# Patient Record
Sex: MALE | Race: BLACK OR AFRICAN AMERICAN | NOT HISPANIC OR LATINO | Employment: UNEMPLOYED | URBAN - METROPOLITAN AREA
[De-identification: names, ages, dates, MRNs, and addresses within clinical notes are randomized per-mention and may not be internally consistent; named-entity substitution may affect disease eponyms.]

---

## 2021-03-20 ENCOUNTER — IMMUNIZATION (OUTPATIENT)
Dept: INTERNAL MEDICINE | Facility: CLINIC | Age: 21
End: 2021-03-20
Payer: OTHER GOVERNMENT

## 2021-03-20 DIAGNOSIS — Z23 NEED FOR VACCINATION: Primary | ICD-10-CM

## 2021-03-20 PROCEDURE — 91300 COVID-19, MRNA, LNP-S, PF, 30 MCG/0.3 ML DOSE VACCINE: ICD-10-PCS | Mod: ,,, | Performed by: FAMILY MEDICINE

## 2021-03-20 PROCEDURE — 0001A COVID-19, MRNA, LNP-S, PF, 30 MCG/0.3 ML DOSE VACCINE: CPT | Mod: CV19,,, | Performed by: FAMILY MEDICINE

## 2021-03-20 PROCEDURE — 91300 COVID-19, MRNA, LNP-S, PF, 30 MCG/0.3 ML DOSE VACCINE: CPT | Mod: ,,, | Performed by: FAMILY MEDICINE

## 2021-03-20 PROCEDURE — 0001A COVID-19, MRNA, LNP-S, PF, 30 MCG/0.3 ML DOSE VACCINE: ICD-10-PCS | Mod: CV19,,, | Performed by: FAMILY MEDICINE

## 2021-04-10 ENCOUNTER — IMMUNIZATION (OUTPATIENT)
Dept: INTERNAL MEDICINE | Facility: CLINIC | Age: 21
End: 2021-04-10
Payer: OTHER GOVERNMENT

## 2021-04-10 DIAGNOSIS — Z23 NEED FOR VACCINATION: Primary | ICD-10-CM

## 2021-04-10 PROCEDURE — 0002A COVID-19, MRNA, LNP-S, PF, 30 MCG/0.3 ML DOSE VACCINE: ICD-10-PCS | Mod: CV19,,, | Performed by: FAMILY MEDICINE

## 2021-04-10 PROCEDURE — 91300 COVID-19, MRNA, LNP-S, PF, 30 MCG/0.3 ML DOSE VACCINE: CPT | Mod: ,,, | Performed by: FAMILY MEDICINE

## 2021-04-10 PROCEDURE — 91300 COVID-19, MRNA, LNP-S, PF, 30 MCG/0.3 ML DOSE VACCINE: ICD-10-PCS | Mod: ,,, | Performed by: FAMILY MEDICINE

## 2021-04-10 PROCEDURE — 0002A COVID-19, MRNA, LNP-S, PF, 30 MCG/0.3 ML DOSE VACCINE: CPT | Mod: CV19,,, | Performed by: FAMILY MEDICINE

## 2022-04-09 ENCOUNTER — HISTORICAL (OUTPATIENT)
Dept: ADMINISTRATIVE | Facility: HOSPITAL | Age: 22
End: 2022-04-09

## 2022-04-29 VITALS
DIASTOLIC BLOOD PRESSURE: 66 MMHG | SYSTOLIC BLOOD PRESSURE: 115 MMHG | HEIGHT: 69 IN | WEIGHT: 133 LBS | BODY MASS INDEX: 19.7 KG/M2

## 2024-08-10 ENCOUNTER — ANESTHESIA EVENT (OUTPATIENT)
Dept: SURGERY | Facility: HOSPITAL | Age: 24
End: 2024-08-10
Payer: COMMERCIAL

## 2024-08-10 ENCOUNTER — HOSPITAL ENCOUNTER (OUTPATIENT)
Facility: HOSPITAL | Age: 24
Discharge: HOME | End: 2024-08-11
Attending: EMERGENCY MEDICINE | Admitting: STUDENT IN AN ORGANIZED HEALTH CARE EDUCATION/TRAINING PROGRAM
Payer: COMMERCIAL

## 2024-08-10 ENCOUNTER — ANESTHESIA (OUTPATIENT)
Dept: SURGERY | Facility: HOSPITAL | Age: 24
End: 2024-08-10
Payer: COMMERCIAL

## 2024-08-10 DIAGNOSIS — R10.31 RIGHT LOWER QUADRANT ABDOMINAL PAIN: ICD-10-CM

## 2024-08-10 DIAGNOSIS — K35.80 ACUTE APPENDICITIS, UNSPECIFIED ACUTE APPENDICITIS TYPE: Primary | ICD-10-CM

## 2024-08-10 PROBLEM — K37 APPENDICITIS: Status: ACTIVE | Noted: 2024-08-10

## 2024-08-10 LAB
ALBUMIN SERPL-MCNC: 4.5 G/DL (ref 3.5–5)
ALBUMIN/GLOB SERPL: 1.2 RATIO (ref 1.1–2)
ALP SERPL-CCNC: 47 UNIT/L (ref 40–150)
ALT SERPL-CCNC: 13 UNIT/L (ref 0–55)
ANION GAP SERPL CALC-SCNC: 11 MEQ/L
AST SERPL-CCNC: 17 UNIT/L (ref 5–34)
BACTERIA #/AREA URNS AUTO: ABNORMAL /HPF
BASOPHILS # BLD AUTO: 0.05 X10(3)/MCL
BASOPHILS NFR BLD AUTO: 0.4 %
BILIRUB SERPL-MCNC: 0.9 MG/DL
BILIRUB UR QL STRIP.AUTO: NEGATIVE
BUN SERPL-MCNC: 14.5 MG/DL (ref 8.9–20.6)
CALCIUM SERPL-MCNC: 10.4 MG/DL (ref 8.4–10.2)
CHLORIDE SERPL-SCNC: 105 MMOL/L (ref 98–107)
CLARITY UR: CLEAR
CO2 SERPL-SCNC: 21 MMOL/L (ref 22–29)
COLOR UR AUTO: YELLOW
CREAT SERPL-MCNC: 1.19 MG/DL (ref 0.73–1.18)
CREAT/UREA NIT SERPL: 12
EOSINOPHIL # BLD AUTO: 0.06 X10(3)/MCL (ref 0–0.9)
EOSINOPHIL NFR BLD AUTO: 0.5 %
ERYTHROCYTE [DISTWIDTH] IN BLOOD BY AUTOMATED COUNT: 13 % (ref 11.5–17)
GFR SERPLBLD CREATININE-BSD FMLA CKD-EPI: >60 ML/MIN/1.73/M2
GLOBULIN SER-MCNC: 3.8 GM/DL (ref 2.4–3.5)
GLUCOSE SERPL-MCNC: 93 MG/DL (ref 74–100)
GLUCOSE UR QL STRIP: NORMAL
HCT VFR BLD AUTO: 46.3 % (ref 42–52)
HGB BLD-MCNC: 15.3 G/DL (ref 14–18)
HGB UR QL STRIP: NEGATIVE
HYALINE CASTS #/AREA URNS LPF: ABNORMAL /LPF
IMM GRANULOCYTES # BLD AUTO: 0.05 X10(3)/MCL (ref 0–0.04)
IMM GRANULOCYTES NFR BLD AUTO: 0.4 %
KETONES UR QL STRIP: ABNORMAL
LEUKOCYTE ESTERASE UR QL STRIP: NEGATIVE
LIPASE SERPL-CCNC: 6 U/L
LYMPHOCYTES # BLD AUTO: 1.26 X10(3)/MCL (ref 0.6–4.6)
LYMPHOCYTES NFR BLD AUTO: 10.9 %
MCH RBC QN AUTO: 27.7 PG (ref 27–31)
MCHC RBC AUTO-ENTMCNC: 33 G/DL (ref 33–36)
MCV RBC AUTO: 83.9 FL (ref 80–94)
MONOCYTES # BLD AUTO: 1.66 X10(3)/MCL (ref 0.1–1.3)
MONOCYTES NFR BLD AUTO: 14.4 %
MUCOUS THREADS URNS QL MICRO: ABNORMAL /LPF
NEUTROPHILS # BLD AUTO: 8.46 X10(3)/MCL (ref 2.1–9.2)
NEUTROPHILS NFR BLD AUTO: 73.4 %
NITRITE UR QL STRIP: NEGATIVE
NRBC BLD AUTO-RTO: 0 %
PH UR STRIP: 6 [PH]
PLATELET # BLD AUTO: 220 X10(3)/MCL (ref 130–400)
PLATELETS.RETICULATED NFR BLD AUTO: 4.1 % (ref 0.9–11.2)
PMV BLD AUTO: 9.8 FL (ref 7.4–10.4)
POTASSIUM SERPL-SCNC: 3.9 MMOL/L (ref 3.5–5.1)
PROT SERPL-MCNC: 8.3 GM/DL (ref 6.4–8.3)
PROT UR QL STRIP: ABNORMAL
RBC # BLD AUTO: 5.52 X10(6)/MCL (ref 4.7–6.1)
RBC #/AREA URNS AUTO: ABNORMAL /HPF
SODIUM SERPL-SCNC: 137 MMOL/L (ref 136–145)
SP GR UR STRIP.AUTO: 1.04 (ref 1–1.03)
SQUAMOUS #/AREA URNS LPF: ABNORMAL /HPF
UROBILINOGEN UR STRIP-ACNC: 2
WBC # BLD AUTO: 11.54 X10(3)/MCL (ref 4.5–11.5)
WBC #/AREA URNS AUTO: ABNORMAL /HPF

## 2024-08-10 PROCEDURE — 96375 TX/PRO/DX INJ NEW DRUG ADDON: CPT

## 2024-08-10 PROCEDURE — 71000033 HC RECOVERY, INTIAL HOUR: Performed by: STUDENT IN AN ORGANIZED HEALTH CARE EDUCATION/TRAINING PROGRAM

## 2024-08-10 PROCEDURE — G0378 HOSPITAL OBSERVATION PER HR: HCPCS

## 2024-08-10 PROCEDURE — 99222 1ST HOSP IP/OBS MODERATE 55: CPT | Mod: 57,,, | Performed by: STUDENT IN AN ORGANIZED HEALTH CARE EDUCATION/TRAINING PROGRAM

## 2024-08-10 PROCEDURE — 96365 THER/PROPH/DIAG IV INF INIT: CPT

## 2024-08-10 PROCEDURE — 44970 LAPAROSCOPY APPENDECTOMY: CPT | Mod: ,,, | Performed by: STUDENT IN AN ORGANIZED HEALTH CARE EDUCATION/TRAINING PROGRAM

## 2024-08-10 PROCEDURE — 36000708 HC OR TIME LEV III 1ST 15 MIN: Performed by: STUDENT IN AN ORGANIZED HEALTH CARE EDUCATION/TRAINING PROGRAM

## 2024-08-10 PROCEDURE — 96372 THER/PROPH/DIAG INJ SC/IM: CPT

## 2024-08-10 PROCEDURE — 96361 HYDRATE IV INFUSION ADD-ON: CPT

## 2024-08-10 PROCEDURE — 25000003 PHARM REV CODE 250: Performed by: STUDENT IN AN ORGANIZED HEALTH CARE EDUCATION/TRAINING PROGRAM

## 2024-08-10 PROCEDURE — 37000008 HC ANESTHESIA 1ST 15 MINUTES: Performed by: STUDENT IN AN ORGANIZED HEALTH CARE EDUCATION/TRAINING PROGRAM

## 2024-08-10 PROCEDURE — 37000009 HC ANESTHESIA EA ADD 15 MINS: Performed by: STUDENT IN AN ORGANIZED HEALTH CARE EDUCATION/TRAINING PROGRAM

## 2024-08-10 PROCEDURE — 63600175 PHARM REV CODE 636 W HCPCS

## 2024-08-10 PROCEDURE — 25000003 PHARM REV CODE 250

## 2024-08-10 PROCEDURE — 85025 COMPLETE CBC W/AUTO DIFF WBC: CPT

## 2024-08-10 PROCEDURE — 80053 COMPREHEN METABOLIC PANEL: CPT

## 2024-08-10 PROCEDURE — 81001 URINALYSIS AUTO W/SCOPE: CPT

## 2024-08-10 PROCEDURE — 99285 EMERGENCY DEPT VISIT HI MDM: CPT | Mod: 25

## 2024-08-10 PROCEDURE — 27201423 OPTIME MED/SURG SUP & DEVICES STERILE SUPPLY: Performed by: STUDENT IN AN ORGANIZED HEALTH CARE EDUCATION/TRAINING PROGRAM

## 2024-08-10 PROCEDURE — 63600175 PHARM REV CODE 636 W HCPCS: Performed by: STUDENT IN AN ORGANIZED HEALTH CARE EDUCATION/TRAINING PROGRAM

## 2024-08-10 PROCEDURE — 25500020 PHARM REV CODE 255: Performed by: EMERGENCY MEDICINE

## 2024-08-10 PROCEDURE — 36000709 HC OR TIME LEV III EA ADD 15 MIN: Performed by: STUDENT IN AN ORGANIZED HEALTH CARE EDUCATION/TRAINING PROGRAM

## 2024-08-10 PROCEDURE — 83690 ASSAY OF LIPASE: CPT

## 2024-08-10 RX ORDER — DEXAMETHASONE SODIUM PHOSPHATE 4 MG/ML
INJECTION, SOLUTION INTRA-ARTICULAR; INTRALESIONAL; INTRAMUSCULAR; INTRAVENOUS; SOFT TISSUE
Status: DISCONTINUED | OUTPATIENT
Start: 2024-08-10 | End: 2024-08-10

## 2024-08-10 RX ORDER — OXYCODONE HYDROCHLORIDE 5 MG/1
5 TABLET ORAL EVERY 4 HOURS PRN
Status: DISCONTINUED | OUTPATIENT
Start: 2024-08-10 | End: 2024-08-11 | Stop reason: HOSPADM

## 2024-08-10 RX ORDER — CEFAZOLIN SODIUM 1 G/3ML
INJECTION, POWDER, FOR SOLUTION INTRAMUSCULAR; INTRAVENOUS
Status: DISCONTINUED | OUTPATIENT
Start: 2024-08-10 | End: 2024-08-10

## 2024-08-10 RX ORDER — ONDANSETRON HYDROCHLORIDE 2 MG/ML
4 INJECTION, SOLUTION INTRAVENOUS
Status: ACTIVE | OUTPATIENT
Start: 2024-08-10 | End: 2024-08-10

## 2024-08-10 RX ORDER — SODIUM CHLORIDE 0.9 % (FLUSH) 0.9 %
10 SYRINGE (ML) INJECTION
Status: DISCONTINUED | OUTPATIENT
Start: 2024-08-10 | End: 2024-08-11 | Stop reason: HOSPADM

## 2024-08-10 RX ORDER — PROPOFOL 10 MG/ML
VIAL (ML) INTRAVENOUS
Status: DISCONTINUED | OUTPATIENT
Start: 2024-08-10 | End: 2024-08-10

## 2024-08-10 RX ORDER — ACETAMINOPHEN 10 MG/ML
INJECTION, SOLUTION INTRAVENOUS
Status: DISCONTINUED | OUTPATIENT
Start: 2024-08-10 | End: 2024-08-10

## 2024-08-10 RX ORDER — TALC
6 POWDER (GRAM) TOPICAL NIGHTLY PRN
Status: DISCONTINUED | OUTPATIENT
Start: 2024-08-10 | End: 2024-08-11 | Stop reason: HOSPADM

## 2024-08-10 RX ORDER — BUPIVACAINE HYDROCHLORIDE AND EPINEPHRINE 2.5; 5 MG/ML; UG/ML
INJECTION, SOLUTION EPIDURAL; INFILTRATION; INTRACAUDAL; PERINEURAL
Status: DISCONTINUED | OUTPATIENT
Start: 2024-08-10 | End: 2024-08-10 | Stop reason: HOSPADM

## 2024-08-10 RX ORDER — MEPERIDINE HYDROCHLORIDE 25 MG/ML
12.5 INJECTION INTRAMUSCULAR; INTRAVENOUS; SUBCUTANEOUS EVERY 10 MIN PRN
OUTPATIENT
Start: 2024-08-10 | End: 2024-08-11

## 2024-08-10 RX ORDER — ONDANSETRON HYDROCHLORIDE 2 MG/ML
4 INJECTION, SOLUTION INTRAVENOUS DAILY PRN
OUTPATIENT
Start: 2024-08-10

## 2024-08-10 RX ORDER — ENOXAPARIN SODIUM 100 MG/ML
40 INJECTION SUBCUTANEOUS EVERY 24 HOURS
Status: DISCONTINUED | OUTPATIENT
Start: 2024-08-10 | End: 2024-08-11 | Stop reason: HOSPADM

## 2024-08-10 RX ORDER — MIDAZOLAM HYDROCHLORIDE 1 MG/ML
INJECTION INTRAMUSCULAR; INTRAVENOUS
Status: DISCONTINUED | OUTPATIENT
Start: 2024-08-10 | End: 2024-08-10

## 2024-08-10 RX ORDER — DIPHENHYDRAMINE HYDROCHLORIDE 50 MG/ML
25 INJECTION INTRAMUSCULAR; INTRAVENOUS EVERY 6 HOURS PRN
OUTPATIENT
Start: 2024-08-10

## 2024-08-10 RX ORDER — MORPHINE SULFATE 4 MG/ML
2 INJECTION, SOLUTION INTRAMUSCULAR; INTRAVENOUS EVERY 6 HOURS PRN
Status: DISCONTINUED | OUTPATIENT
Start: 2024-08-10 | End: 2024-08-11 | Stop reason: HOSPADM

## 2024-08-10 RX ORDER — PHENYLEPHRINE HCL IN 0.9% NACL 1 MG/10 ML
SYRINGE (ML) INTRAVENOUS
Status: DISCONTINUED | OUTPATIENT
Start: 2024-08-10 | End: 2024-08-10

## 2024-08-10 RX ORDER — ONDANSETRON HYDROCHLORIDE 2 MG/ML
INJECTION, SOLUTION INTRAVENOUS
Status: DISCONTINUED | OUTPATIENT
Start: 2024-08-10 | End: 2024-08-10

## 2024-08-10 RX ORDER — SODIUM CHLORIDE 0.9 % (FLUSH) 0.9 %
10 SYRINGE (ML) INJECTION
OUTPATIENT
Start: 2024-08-10

## 2024-08-10 RX ORDER — SODIUM CHLORIDE 9 MG/ML
1000 INJECTION, SOLUTION INTRAVENOUS
Status: COMPLETED | OUTPATIENT
Start: 2024-08-10 | End: 2024-08-10

## 2024-08-10 RX ORDER — FENTANYL CITRATE 50 UG/ML
INJECTION, SOLUTION INTRAMUSCULAR; INTRAVENOUS
Status: DISCONTINUED | OUTPATIENT
Start: 2024-08-10 | End: 2024-08-10

## 2024-08-10 RX ORDER — LIDOCAINE HYDROCHLORIDE 20 MG/ML
INJECTION INTRAVENOUS
Status: DISCONTINUED | OUTPATIENT
Start: 2024-08-10 | End: 2024-08-10

## 2024-08-10 RX ORDER — ONDANSETRON 4 MG/1
8 TABLET, ORALLY DISINTEGRATING ORAL EVERY 8 HOURS PRN
Status: DISCONTINUED | OUTPATIENT
Start: 2024-08-10 | End: 2024-08-11 | Stop reason: HOSPADM

## 2024-08-10 RX ORDER — KETOROLAC TROMETHAMINE 30 MG/ML
30 INJECTION, SOLUTION INTRAMUSCULAR; INTRAVENOUS
Status: COMPLETED | OUTPATIENT
Start: 2024-08-10 | End: 2024-08-10

## 2024-08-10 RX ORDER — ROCURONIUM BROMIDE 10 MG/ML
INJECTION, SOLUTION INTRAVENOUS
Status: DISCONTINUED | OUTPATIENT
Start: 2024-08-10 | End: 2024-08-10

## 2024-08-10 RX ORDER — HYDROMORPHONE HYDROCHLORIDE 2 MG/ML
0.5 INJECTION, SOLUTION INTRAMUSCULAR; INTRAVENOUS; SUBCUTANEOUS EVERY 5 MIN PRN
OUTPATIENT
Start: 2024-08-10

## 2024-08-10 RX ORDER — ACETAMINOPHEN 325 MG/1
650 TABLET ORAL EVERY 4 HOURS PRN
Status: DISCONTINUED | OUTPATIENT
Start: 2024-08-10 | End: 2024-08-11 | Stop reason: HOSPADM

## 2024-08-10 RX ORDER — LIDOCAINE HYDROCHLORIDE 10 MG/ML
1 INJECTION, SOLUTION INFILTRATION; PERINEURAL ONCE AS NEEDED
Status: DISCONTINUED | OUTPATIENT
Start: 2024-08-10 | End: 2024-08-11 | Stop reason: HOSPADM

## 2024-08-10 RX ORDER — GLUCAGON 1 MG
1 KIT INJECTION
OUTPATIENT
Start: 2024-08-10

## 2024-08-10 RX ORDER — ACETAMINOPHEN 325 MG/1
650 TABLET ORAL EVERY 8 HOURS PRN
Status: DISCONTINUED | OUTPATIENT
Start: 2024-08-10 | End: 2024-08-11 | Stop reason: HOSPADM

## 2024-08-10 RX ORDER — OXYCODONE HYDROCHLORIDE 10 MG/1
10 TABLET ORAL EVERY 4 HOURS PRN
Status: DISCONTINUED | OUTPATIENT
Start: 2024-08-10 | End: 2024-08-11 | Stop reason: HOSPADM

## 2024-08-10 RX ADMIN — ONDANSETRON 4 MG: 2 INJECTION INTRAMUSCULAR; INTRAVENOUS at 05:08

## 2024-08-10 RX ADMIN — ROCURONIUM BROMIDE 50 MG: 10 SOLUTION INTRAVENOUS at 04:08

## 2024-08-10 RX ADMIN — MIDAZOLAM HYDROCHLORIDE 2 MG: 1 INJECTION, SOLUTION INTRAMUSCULAR; INTRAVENOUS at 04:08

## 2024-08-10 RX ADMIN — ACETAMINOPHEN 1000 MG: 10 INJECTION, SOLUTION INTRAVENOUS at 04:08

## 2024-08-10 RX ADMIN — SUGAMMADEX 200 MG: 100 INJECTION, SOLUTION INTRAVENOUS at 05:08

## 2024-08-10 RX ADMIN — IOHEXOL 100 ML: 350 INJECTION, SOLUTION INTRAVENOUS at 12:08

## 2024-08-10 RX ADMIN — FENTANYL CITRATE 50 MCG: 50 INJECTION, SOLUTION INTRAMUSCULAR; INTRAVENOUS at 04:08

## 2024-08-10 RX ADMIN — PIPERACILLIN AND TAZOBACTAM 4.5 G: 4; .5 INJECTION, POWDER, LYOPHILIZED, FOR SOLUTION INTRAVENOUS; PARENTERAL at 01:08

## 2024-08-10 RX ADMIN — DEXAMETHASONE SODIUM PHOSPHATE 4 MG: 4 INJECTION, SOLUTION INTRA-ARTICULAR; INTRALESIONAL; INTRAMUSCULAR; INTRAVENOUS; SOFT TISSUE at 04:08

## 2024-08-10 RX ADMIN — Medication 50 MCG: at 04:08

## 2024-08-10 RX ADMIN — SODIUM CHLORIDE, SODIUM GLUCONATE, SODIUM ACETATE, POTASSIUM CHLORIDE AND MAGNESIUM CHLORIDE: 526; 502; 368; 37; 30 INJECTION, SOLUTION INTRAVENOUS at 04:08

## 2024-08-10 RX ADMIN — CEFAZOLIN 2 G: 330 INJECTION, POWDER, FOR SOLUTION INTRAMUSCULAR; INTRAVENOUS at 04:08

## 2024-08-10 RX ADMIN — Medication 50 MCG: at 05:08

## 2024-08-10 RX ADMIN — ROCURONIUM BROMIDE 20 MG: 10 SOLUTION INTRAVENOUS at 05:08

## 2024-08-10 RX ADMIN — KETOROLAC TROMETHAMINE 30 MG: 30 INJECTION, SOLUTION INTRAMUSCULAR at 10:08

## 2024-08-10 RX ADMIN — ENOXAPARIN SODIUM 40 MG: 40 INJECTION SUBCUTANEOUS at 07:08

## 2024-08-10 RX ADMIN — SODIUM CHLORIDE 1000 ML: 9 INJECTION, SOLUTION INTRAVENOUS at 10:08

## 2024-08-10 RX ADMIN — PROPOFOL 200 MG: 10 INJECTION, EMULSION INTRAVENOUS at 04:08

## 2024-08-10 RX ADMIN — LIDOCAINE HYDROCHLORIDE 80 MG: 20 INJECTION INTRAVENOUS at 04:08

## 2024-08-10 NOTE — H&P
Acute Care Surgery   History and Physical    Patient Name: Antonio Daigle  YOB: 2000  Date: 08/10/2024 6:44 PM  Date of Admission: 8/10/2024  HD#0  POD#Day of Surgery    PRESENTING HISTORY   Chief Complaint/Reason for Admission: <principal problem not specified>    History of Present Illness:  23 yo male with no PMH presenting with LLQ pain that began yesterday. Denies nausea/vomiting. Last meal was yesterday at 2 PM.    Review of Systems:  12 point ROS negative except as stated in HPI    PAST HISTORY:   Past medical history:  none    Past surgical history:  none    Family history:  Family History   Problem Relation Name Age of Onset    Hyperlipidemia Mother      Diabetes Father      Heart attack Paternal Grandfather         Social history:  Social History     Socioeconomic History    Marital status: Unknown   Tobacco Use    Smoking status: Never     Passive exposure: Never    Smokeless tobacco: Never   Substance and Sexual Activity    Alcohol use: Never    Drug use: Never   Social History Narrative    ** Merged History Encounter **          Social History     Tobacco Use   Smoking Status Never    Passive exposure: Never   Smokeless Tobacco Never      Social History     Substance and Sexual Activity   Alcohol Use Never        MEDICATIONS & ALLERGIES:     No current facility-administered medications on file prior to encounter.     No current outpatient medications on file prior to encounter.       Allergies: Review of patient's allergies indicates:  No Known Allergies    Scheduled Meds:   enoxparin  40 mg Subcutaneous Daily    ondansetron  4 mg Intravenous ED 1 Time    piperacillin-tazobactam (Zosyn) IV (PEDS and ADULTS) (extended infusion is not appropriate)  4.5 g Intravenous Q8H       Continuous Infusions:    PRN Meds:  Current Facility-Administered Medications:     acetaminophen, 650 mg, Oral, Q8H PRN    acetaminophen, 650 mg, Oral, Q4H PRN    LIDOcaine HCL 10 mg/ml (1%), 1 mL,  "Intradermal, Once PRN    melatonin, 6 mg, Oral, Nightly PRN    ondansetron, 8 mg, Oral, Q8H PRN    oxyCODONE, 10 mg, Oral, Q4H PRN    oxyCODONE, 5 mg, Oral, Q4H PRN    sodium chloride 0.9%, 10 mL, Intravenous, PRN    OBJECTIVE:   Vital Signs:  VITAL SIGNS: 24 HR MIN & MAX LAST   Temp  Min: 97.9 °F (36.6 °C)  Max: 99.3 °F (37.4 °C)  (P) 97.9 °F (36.6 °C)   BP  Min: 112/53  Max: 146/72  (!) 112/53    Pulse  Min: 65  Max: 86  69    Resp  Min: 18  Max: 26  (!) 26    SpO2  Min: 95 %  Max: 100 %  97 %      HT: 5' 9" (175.3 cm)  WT: 59.9 kg (132 lb)  BMI: 19.5     Intake/output:  Intake/Output - Last 3 Shifts         08/08 0700  08/09 0659 08/09 0700  08/10 0659 08/10 0700  08/11 0659    IV Piggyback   900.9    Total Intake(mL/kg)   900.9 (15)    Urine (mL/kg/hr)   275    Blood   25    Total Output   300    Net   +600.9                   Intake/Output Summary (Last 24 hours) at 8/10/2024 1844  Last data filed at 8/10/2024 1750  Gross per 24 hour   Intake 900.88 ml   Output 300 ml   Net 600.88 ml         Physical Exam:  General: Well developed, well nourished, no acute distress  HEENT: Normocephalic, atraumatic, PERRL  CV: RR  Resp: NWOB  GI:  Abdomen soft, non-distended, TTP LLQ with localized rebound, no guarding  :  Deferred  MSK: No muscle atrophy, cyanosis, peripheral edema, moving all extremities spontaneously  Skin/wounds:  No rashes, ulcers, erythema  Neuro:  CNII-XII grossly intact, alert and oriented to person, place, and time    Labs:  Troponin:  No results for input(s): "TROPONINI" in the last 72 hours.  CBC:  Recent Labs     08/10/24  1011   WBC 11.54*   RBC 5.52   HGB 15.3   HCT 46.3      MCV 83.9   MCH 27.7   MCHC 33.0     CMP:  Recent Labs     08/10/24  1011   CALCIUM 10.4*   ALBUMIN 4.5      K 3.9   CO2 21*      BUN 14.5   CREATININE 1.19*   ALKPHOS 47   ALT 13   AST 17   BILITOT 0.9         Diagnostic Results:  CT Abdomen Pelvis With IV Contrast NO Oral Contrast   Final Result    "   Findings consistent with acute appendicitis as detailed above without evidence of perforation or abscess.         Electronically signed by: Jonathan Noble MD   Date:    08/10/2024   Time:    12:51          ASSESSMENT & PLAN:    23 yo male with no PMH presenting with acute appendicitis    - Plan for OR today  - Zosyn  - NPO  - MMPC  - Bowel regimen  - PRN anti-emetics  - SCDs and Lovenox    Jolene Dent MD  [employer] General Surgery, PGY-1  08/10/2024

## 2024-08-10 NOTE — TRANSFER OF CARE
"Anesthesia Transfer of Care Note    Patient: Antonio Daigle    Procedure(s) Performed: Procedure(s) (LRB):  APPENDECTOMY, LAPAROSCOPIC (N/A)    Patient location: PACU    Anesthesia Type: general    Transport from OR: Transported from OR on room air with adequate spontaneous ventilation    Post pain: adequate analgesia    Post assessment: no apparent anesthetic complications    Post vital signs: stable    Level of consciousness: sedated    Nausea/Vomiting: no nausea/vomiting    Complications: none    Transfer of care protocol was followed      Last vitals: Visit Vitals  BP (!) 143/72 (BP Location: Right arm, Patient Position: Lying)   Pulse 65   Temp 37.4 °C (99.3 °F) (Oral)   Resp 18   Ht 5' 9" (1.753 m)   Wt 59.9 kg (132 lb)   SpO2 98%   BMI 19.49 kg/m²     "

## 2024-08-10 NOTE — ANESTHESIA PROCEDURE NOTES
Intubation    Date/Time: 8/10/2024 4:38 PM    Performed by: Charla Fish CRNA  Authorized by: Troy Domínguez MD    Intubation:     Induction:  Intravenous    Intubated:  Postinduction    Mask Ventilation:  Easy mask    Attempts:  1    Attempted By:  CRNA    Method of Intubation:  Direct    Blade:  Ayala 2    Laryngeal View Grade: Grade I - full view of cords      Difficult Airway Encountered?: No      Complications:  None    Airway Device:  Oral endotracheal tube    Airway Device Size:  7.5    Style/Cuff Inflation:  Cuffed (inflated to minimal occlusive pressure) (cuff inflated to [id number] as verified by manometer)    Inflation Amount (mL):  6    Tube secured:  22    Secured at:  The lips    Placement Verified By:  Capnometry    Complicating Factors:  None    Findings Post-Intubation:  BS equal bilateral and atraumatic/condition of teeth unchanged

## 2024-08-10 NOTE — ED PROVIDER NOTES
Encounter Date: 8/10/2024       History     Chief Complaint   Patient presents with    Abdominal Pain     RLQ pain initially yesterday. Pain reproducible when walking. +nausea, denies vomiting. Advil 0730 without relief.      24 y.o.  male presents to Emergency Department with a chief complaint of abdominal pain. Symptoms began on yesterday and have been constant since onset. Associated symptoms include nausea. Symptoms are aggravated with palpation and movement and there are no alleviating factors. The patient denies CP, SOB, fever, vomiting, injury/trauma, or chills. States he recently lifted boards, but did not think they were heavy. No other reported symptoms at this time      The history is provided by the patient. No  was used.   Abdominal Pain  The current episode started yesterday. The onset of the illness was abrupt. The abdominal pain is located in the RLQ. The abdominal pain does not radiate. The other symptoms of the illness include nausea. The other symptoms of the illness do not include fatigue, jaundice, shortness of breath, vomiting, diarrhea or dysuria.   Nausea began yesterday.   The patient has not had a change in bowel habit. Symptoms associated with the illness do not include chills, anorexia, diaphoresis, urgency, hematuria or frequency.     Review of patient's allergies indicates:  No Known Allergies  History reviewed. No pertinent past medical history.  History reviewed. No pertinent surgical history.  No family history on file.     Review of Systems   Constitutional:  Negative for chills, diaphoresis and fatigue.   Eyes:  Negative for photophobia and visual disturbance.   Respiratory:  Negative for cough, shortness of breath, wheezing and stridor.    Cardiovascular:  Negative for chest pain, palpitations and leg swelling.   Gastrointestinal:  Positive for abdominal pain and nausea. Negative for anorexia, diarrhea, jaundice and vomiting.   Genitourinary:   Negative for dysuria, flank pain, frequency, hematuria and urgency.   All other systems reviewed and are negative.      Physical Exam     Initial Vitals   BP Pulse Resp Temp SpO2   08/10/24 0947 08/10/24 0947 08/10/24 0947 08/10/24 0947 08/10/24 1018   123/72 86 18 98.7 °F (37.1 °C) 98 %      MAP       --                Physical Exam    Nursing note and vitals reviewed.  Constitutional: He appears well-developed and well-nourished. He is not diaphoretic. He is cooperative.  Non-toxic appearance. No distress.   HENT:   Head: Normocephalic and atraumatic.   Right Ear: External ear normal.   Left Ear: External ear normal.   Nose: Nose normal.   Eyes: Conjunctivae and EOM are normal. Pupils are equal, round, and reactive to light.   Neck: Neck supple.   Normal range of motion.  Cardiovascular:  Normal rate, regular rhythm, S1 normal, S2 normal, normal heart sounds, intact distal pulses and normal pulses.           Pulmonary/Chest: Effort normal and breath sounds normal. No tachypnea and no bradypnea. No respiratory distress. He has no decreased breath sounds. He has no wheezes. He has no rhonchi. He has no rales. He exhibits no tenderness.   Abdominal: Abdomen is soft. Bowel sounds are normal. He exhibits no distension. There is abdominal tenderness in the right lower quadrant. There is no rebound.   Musculoskeletal:         General: Normal range of motion.      Cervical back: Normal range of motion and neck supple.     Neurological: He is alert and oriented to person, place, and time. He has normal strength. No sensory deficit. GCS score is 15. GCS eye subscore is 4. GCS verbal subscore is 5. GCS motor subscore is 6.   Skin: Skin is warm and dry. Capillary refill takes less than 2 seconds.   Psychiatric: He has a normal mood and affect. Thought content normal.         ED Course   Procedures  Labs Reviewed   COMPREHENSIVE METABOLIC PANEL - Abnormal       Result Value    Sodium 137      Potassium 3.9      Chloride 105       CO2 21 (*)     Glucose 93      Blood Urea Nitrogen 14.5      Creatinine 1.19 (*)     Calcium 10.4 (*)     Protein Total 8.3      Albumin 4.5      Globulin 3.8 (*)     Albumin/Globulin Ratio 1.2      Bilirubin Total 0.9      ALP 47      ALT 13      AST 17      eGFR >60      Anion Gap 11.0      BUN/Creatinine Ratio 12     URINALYSIS, REFLEX TO URINE CULTURE - Abnormal    Color, UA Yellow      Appearance, UA Clear      Specific Gravity, UA 1.036 (*)     pH, UA 6.0      Protein, UA 1+ (*)     Glucose, UA Normal      Ketones, UA 1+ (*)     Blood, UA Negative      Bilirubin, UA Negative      Urobilinogen, UA 2.0 (*)     Nitrites, UA Negative      Leukocyte Esterase, UA Negative      RBC, UA 0-5      WBC, UA 0-5      Bacteria, UA None Seen      Squamous Epithelial Cells, UA None Seen      Mucous, UA Few (*)     Hyaline Casts, UA 0-2 (*)    CBC WITH DIFFERENTIAL - Abnormal    WBC 11.54 (*)     RBC 5.52      Hgb 15.3      Hct 46.3      MCV 83.9      MCH 27.7      MCHC 33.0      RDW 13.0      Platelet 220      MPV 9.8      Neut % 73.4      Lymph % 10.9      Mono % 14.4      Eos % 0.5      Basophil % 0.4      Lymph # 1.26      Neut # 8.46      Mono # 1.66 (*)     Eos # 0.06      Baso # 0.05      IG# 0.05 (*)     IG% 0.4      NRBC% 0.0      IPF 4.1     LIPASE - Normal    Lipase Level 6     CBC W/ AUTO DIFFERENTIAL    Narrative:     The following orders were created for panel order CBC W/ AUTO DIFFERENTIAL.  Procedure                               Abnormality         Status                     ---------                               -----------         ------                     CBC with Differential[[id number]]       Abnormal            Final result                 Please view results for these tests on the individual orders.          Imaging Results              CT Abdomen Pelvis With IV Contrast NO Oral Contrast (Final result)  Result time 08/10/24 12:51:25      Final result by Jonathan Noble MD (08/10/24 12:51:25)                    Impression:      Findings consistent with acute appendicitis as detailed above without evidence of perforation or abscess.      Electronically signed by: Jonathan Noble MD  Date:    08/10/2024  Time:    12:51               Narrative:    EXAMINATION:  CT ABDOMEN PELVIS WITH IV CONTRAST    CLINICAL HISTORY:  RLQ abdominal pain (Age >= 14y);    TECHNIQUE:  Axial images of the abdomen and pelvis were obtained with IV contrast administration.  Coronal and sagittal reconstructions were provided.  Three dimensional and MIP images were obtained and evaluated.  Total DLP was 345 mGy-cm. Dose lowering technique and automated exposure control were utilized for this exam.    COMPARISON:  None    FINDINGS:  The bilateral lung bases are clear.  The heart is normal in size.    The liver is homogeneous in attenuation.  The portal vein is patent.  The gallbladder, spleen, pancreas, and adrenal glands are normal.  The bilateral kidneys are normal.  There is no hydronephrosis or nephrolithiasis.    The appendix is dilated and fluid-filled measuring 16 mm in diameter.  There are 2 appendicoliths at the mid appendix.  There is surrounding fluid in the right pericolic gutter.  There is no extraluminal air.  There is no abscess.  The colon is normal.    The urinary bladder is normal.  There is no pelvic or retroperitoneal lymphadenopathy.  The aorta is nonaneurysmal.  There is no lytic or blastic osseous lesion.                                       Medications   ondansetron injection 4 mg (4 mg Intravenous Not Given 8/10/24 1000)   0.9%  NaCl infusion (0 mLs Intravenous Stopped 8/10/24 1107)   ketorolac injection 30 mg (30 mg Intravenous Given 8/10/24 1047)   iohexoL (OMNIPAQUE 350) injection 100 mL (100 mLs Intravenous Given 8/10/24 1241)   piperacillin-tazobactam (ZOSYN) 4.5 g in D5W 100 mL IVPB (MB+) (4.5 g Intravenous New Bag 8/10/24 1335)     Medical Decision Making  Patient awake, alert, has non-labored breathing, and  follows commands appropriately. Arrived to ED due to RLQ abdominal pain and nausea. Symptoms began on yesterday. Afebrile. NAD Noted.         Differential Diagnosis: Appendicitis, Kidney Stone, Musculoskeletal Pain    Amount and/or Complexity of Data Reviewed  Labs: ordered. Decision-making details documented in ED Course.     Details: Mild leukocytosis noted. CO2-21. Informed patient of results.   Radiology: ordered. Decision-making details documented in ED Course.     Details: Informed patient of results.   Discussion of management or test interpretation with external provider(s): Patient's CT revealed appendicitis; will admit to surgical services for further treatment and evaluation. Discussed plan of care and interventions with patient. Agreed to and aware of plan of care. Comfortable being admitted.    Risk  Decision regarding hospitalization.               ED Course as of 08/10/24 1418   Sat Aug 10, 2024   1039 WBC(!): 11.54 [JA]   1039 CO2(!): 21 [JA]   1216 Patient reports improvement in symptoms after med admin. Pending CT scan.  [JA]   1255 CT Abdomen Pelvis With IV Contrast NO Oral Contrast  Findings consistent with acute appendicitis as detailed above without evidence of perforation or abscess. [JA]   1255 Discussed patient with Dr. Zuniga who assessed the patient at bedside. Will consult surgery if CT reveals appendicitis.  [JA]   1300 Surgery paged.  [JA]   1411 Dr. Dent, with surgery at bedside. Will admit to services. No further instruction or recommendations given.  [JA]      ED Course User Index  [JA] Ibis Rothman, MAU                             Clinical Impression:  Final diagnoses:  [R10.31] Right lower quadrant abdominal pain (Primary)  [K35.80] Acute appendicitis, unspecified acute appendicitis type          ED Disposition Condition    Admit Stable                Ibis Rothman, MAU  08/10/24 1416

## 2024-08-10 NOTE — BRIEF OP NOTE
Brief Operative Note  Patient Name: Antonio Daigle  MRN: [id number]  YOB: 2000  Admit Date: 8/10/2024  #0  Date of Procedure: 08/10/2024     Procedure: Laparoscopic appendectomy    Surgeon(s) and Role:  Staff: Dr. Lorenzo Ratliff  Resident(s): Jolene Dent PGY1, Avelina Valenzuela PGY2    Pre-Operative Diagnosis: acute appendicitis    Post-Operative Diagnosis: Same    Anesthesia: GETA    Operative Findings: inflamed appendix without perforation or abscess    Description of Technical Procedures: Refer to full dictated operative report    Estimated Blood Loss (EBL): 5 cc           Implants: none    Drains: none    Specimens: appendix    Complications: none            Condition: stable    Disposition: floor    Jolene Dent MD  [employer] General Surgery, PGY-1  08/10/2024

## 2024-08-10 NOTE — ANESTHESIA PREPROCEDURE EVALUATION
08/10/2024  Antonio Daigle is a 24 y.o., male.      Na 137, K 3.9, Cr 1.19  15 / 46 / 220k    Pre-op Assessment    I have reviewed the Patient Summary Reports.     I have reviewed the Nursing Notes. I have reviewed the NPO Status.   I have reviewed the Medications.     Review of Systems  Anesthesia Hx:               Denies Personal Hx of Anesthesia complications.                    Cardiovascular:  Exercise tolerance: good                                           Pulmonary:  Pulmonary Normal                       Hepatic/GI:        Acute appendicitis          Neurological:  Neurology Normal                                      Endocrine:  Endocrine Normal            Psych:  Psychiatric Normal                    Physical Exam  General: Well nourished, Cooperative and Alert    Airway:  Mallampati: II   Mouth Opening: Normal  TM Distance: Normal  Tongue: Normal    Dental:  Intact    Chest/Lungs:  Normal Respiratory Rate    Heart:  Rate: Normal        Anesthesia Plan  Type of Anesthesia, risks & benefits discussed:    Anesthesia Type: Gen ETT  Intra-op Monitoring Plan: Standard ASA Monitors  Post Op Pain Control Plan: multimodal analgesia  Induction:  IV  Informed Consent: Informed consent signed with the Patient and all parties understand the risks and agree with anesthesia plan.  All questions answered.   ASA Score: 1 Emergent  Day of Surgery Review of History & Physical: H&P Update referred to the surgeon/provider.    Ready For Surgery From Anesthesia Perspective.     .

## 2024-08-11 VITALS
OXYGEN SATURATION: 98 % | WEIGHT: 132 LBS | HEART RATE: 78 BPM | TEMPERATURE: 99 F | DIASTOLIC BLOOD PRESSURE: 58 MMHG | HEIGHT: 69 IN | SYSTOLIC BLOOD PRESSURE: 105 MMHG | BODY MASS INDEX: 19.55 KG/M2 | RESPIRATION RATE: 18 BRPM

## 2024-08-11 PROCEDURE — 25000003 PHARM REV CODE 250

## 2024-08-11 PROCEDURE — G0378 HOSPITAL OBSERVATION PER HR: HCPCS

## 2024-08-11 RX ORDER — OXYCODONE HYDROCHLORIDE 5 MG/1
5 TABLET ORAL EVERY 4 HOURS PRN
Qty: 10 TABLET | Refills: 0 | Status: SHIPPED | OUTPATIENT
Start: 2024-08-11 | End: 2024-08-11 | Stop reason: HOSPADM

## 2024-08-11 RX ORDER — OXYCODONE AND ACETAMINOPHEN 5; 325 MG/1; MG/1
1 TABLET ORAL EVERY 6 HOURS PRN
Qty: 10 TABLET | Refills: 0 | Status: SHIPPED | OUTPATIENT
Start: 2024-08-11

## 2024-08-11 RX ADMIN — OXYCODONE 5 MG: 5 TABLET ORAL at 03:08

## 2024-08-11 RX ADMIN — OXYCODONE 5 MG: 5 TABLET ORAL at 07:08

## 2024-08-11 NOTE — PLAN OF CARE
08/11/24 1203   Final Note   Assessment Type Final Discharge Note   Anticipated Discharge Disposition Home   Post-Acute Status   Discharge Delays None known at this time     Patient discharged to home with no identified needs.

## 2024-08-11 NOTE — NURSING
Discharged home with prescription to be picked up at [facility]. Verbalized understanding instructions given.  W/C to main entrance for .

## 2024-08-11 NOTE — DISCHARGE SUMMARY
[facility] [facility]  General Surgery  Discharge Summary      Patient Name: Antonio Daigle  MRN: [id number]  Admission Date: 8/10/2024  Hospital Length of Stay: 0 days  Discharge Date and Time:  08/11/2024 11:06 AM  Attending Physician: Lorenzo Ratliff MD   Discharging Provider: Avelina Valenzuela MD  Primary Care Provider: Olga Kelly Jr., MD     HPI: 23 yo male with no PMH presenting with LLQ pain that began yesterday. Denies nausea/vomiting. Last meal was yesterday at 2 PM.     Procedure(s) (LRB):  APPENDECTOMY, LAPAROSCOPIC (N/A)     Hospital Course: Antonio Daigle is a 24 y.o. male admitted on 8/10/2024. Patient was seen and examined in ED. Clinical presentation suggested acute appendicitis. After risks, benefits and alternatives were discussed patient was scheduled for a lap appy on 8/10 at [facility]. Patient underwent the aforementioned procedure without complication. For further details please refer to the operative report. Over the ensuing hospital course patients clinical condition continued to improve and on 8/11/2024 all discharge criteria had been met and patient was deemed stable enough for discharge.    Consults: none    Significant Diagnostic Studies: Radiology: CT scan: CT ABDOMEN PELVIS WITH CONTRAST:   Results for orders placed or performed during the hospital encounter of 08/10/24   CT Abdomen Pelvis With IV Contrast NO Oral Contrast    Narrative    EXAMINATION:  CT ABDOMEN PELVIS WITH IV CONTRAST    CLINICAL HISTORY:  RLQ abdominal pain (Age >= 14y);    TECHNIQUE:  Axial images of the abdomen and pelvis were obtained with IV contrast administration.  Coronal and sagittal reconstructions were provided.  Three dimensional and MIP images were obtained and evaluated.  Total DLP was 345 mGy-cm. Dose lowering technique and automated exposure control were utilized for this exam.    COMPARISON:  None    FINDINGS:  The bilateral lung bases are clear.  The  heart is normal in size.    The liver is homogeneous in attenuation.  The portal vein is patent.  The gallbladder, spleen, pancreas, and adrenal glands are normal.  The bilateral kidneys are normal.  There is no hydronephrosis or nephrolithiasis.    The appendix is dilated and fluid-filled measuring 16 mm in diameter.  There are 2 appendicoliths at the mid appendix.  There is surrounding fluid in the right pericolic gutter.  There is no extraluminal air.  There is no abscess.  The colon is normal.    The urinary bladder is normal.  There is no pelvic or retroperitoneal lymphadenopathy.  The aorta is nonaneurysmal.  There is no lytic or blastic osseous lesion.      Impression    Findings consistent with acute appendicitis as detailed above without evidence of perforation or abscess.      Electronically signed by: Jonathan Noble MD  Date:    08/10/2024  Time:    12:51       Pending Diagnostic Studies:       Procedure Component Value Units Date/Time    Specimen to Pathology [[id number]] Collected: 08/10/24 1710    Order Status: Sent Lab Status: No result     Specimen: Tissue from Appendix           Final Active Diagnoses:    Diagnosis Date Noted POA    PRINCIPAL PROBLEM:  Appendicitis [K37] 08/10/2024 Yes      Problems Resolved During this Admission:      Discharged Condition: stable    Disposition: Home or Self Care    Follow Up:    Patient Instructions:      Diet Adult Regular     Activity as tolerated     Medications:  Reconciled Home Medications:      Medication List        START taking these medications      oxyCODONE 5 MG immediate release tablet  Commonly known as: ROXICODONE  Take 1 tablet (5 mg total) by mouth every 4 (four) hours as needed for Pain.              Avelina Valenzuela MD  General Surgery  [facility] [address] [facility]

## 2024-08-11 NOTE — NURSING
Nurses Note -- 4 Eyes      8/10/2024   8:00 PM      Skin assessed during: Admit      [] No Altered Skin Integrity Present    []Prevention Measures Documented      [x] Yes- Altered Skin Integrity Present or Discovered   [x] LDA Added if Not in Epic (Describe Wound)   [x] New Altered Skin Integrity was Present on Admit and Documented in LDA   [] Wound Image Taken    Wound Care Consulted? No, surgical incisions.  Attending Nurse:  Daiana Jerome RN/Staff Member:   Francisca

## 2024-08-11 NOTE — PLAN OF CARE
Problem: Adult Inpatient Plan of Care  Goal: Plan of Care Review  8/11/2024 1401 by Bridgette Buck RN  Outcome: Met  8/11/2024 1257 by Bridgette Buck RN  Outcome: Progressing  Goal: Patient-Specific Goal (Individualized)  8/11/2024 1401 by Bridgette Buck RN  Outcome: Met  8/11/2024 1257 by Bridgette Buck RN  Outcome: Progressing  Goal: Absence of Hospital-Acquired Illness or Injury  8/11/2024 1401 by Bridgette Buck RN  Outcome: Met  8/11/2024 1257 by Bridgette Buck RN  Outcome: Progressing  Goal: Optimal Comfort and Wellbeing  8/11/2024 1401 by Bridgette Buck RN  Outcome: Met  8/11/2024 1257 by Bridgette Buck RN  Outcome: Progressing  Goal: Readiness for Transition of Care  8/11/2024 1401 by Bridgette Buck RN  Outcome: Met  8/11/2024 1257 by Bridgette Buck RN  Outcome: Progressing     Problem: Wound  Goal: Optimal Coping  8/11/2024 1401 by Bridgette Buck RN  Outcome: Met  8/11/2024 1257 by Bridgette Buck RN  Outcome: Progressing  Goal: Optimal Functional Ability  8/11/2024 1401 by Bridgette Buck RN  Outcome: Met  8/11/2024 1257 by Bridgette Buck RN  Outcome: Progressing  Goal: Absence of Infection Signs and Symptoms  8/11/2024 1401 by Bridgette Buck RN  Outcome: Met  8/11/2024 1257 by Bridgette Buck RN  Outcome: Progressing  Goal: Improved Oral Intake  8/11/2024 1401 by Bridgette Buck RN  Outcome: Met  8/11/2024 1257 by Bridgette Buck RN  Outcome: Progressing  Goal: Optimal Pain Control and Function  8/11/2024 1401 by Bridgette Buck RN  Outcome: Met  8/11/2024 1257 by Bridgette Buck RN  Outcome: Progressing  Goal: Skin Health and Integrity  8/11/2024 1401 by Bridgette Buck RN  Outcome: Met  8/11/2024 1257 by Bridgette Buck RN  Outcome: Progressing  Goal: Optimal Wound Healing  8/11/2024 1401 by Bridgette Buck RN  Outcome: Met  8/11/2024 1257 by Buck, Bridgette, RN  Outcome: Progressing     Problem: Fall Injury Risk  Goal: Absence of Fall and Fall-Related  Injury  8/11/2024 1401 by Bridgette Buck, RN  Outcome: Met  8/11/2024 1257 by Bridgette Buck, RN  Outcome: Progressing

## 2024-08-11 NOTE — ANESTHESIA POSTPROCEDURE EVALUATION
Anesthesia Post Evaluation    Patient: Antonio Daigle    Procedure(s) Performed: Procedure(s) (LRB):  APPENDECTOMY, LAPAROSCOPIC (N/A)    Final Anesthesia Type: general      Patient location during evaluation: PACU  Patient participation: Yes- Able to Participate  Level of consciousness: awake and alert  Post-procedure vital signs: reviewed and stable  Pain management: adequate  Airway patency: patent    PONV status at discharge: No PONV  Anesthetic complications: no      Respiratory status: unassisted  Hydration status: euvolemic  Follow-up not needed.              Vitals Value Taken Time   /56 08/11/24 0439   Temp 37.4 °C (99.4 °F) 08/11/24 0439   Pulse 74 08/11/24 0439   Resp 18 08/11/24 0439   SpO2 97 % 08/11/24 0439         Event Time   Out of Recovery 19:43:13         Pain/Angie Score: Pain Rating Prior to Med Admin: 4 (8/10/2024 10:47 AM)  Pain Rating Post Med Admin: 3 (8/10/2024  1:10 PM)  Angie Score: 9 (8/10/2024  7:20 PM)

## 2024-08-12 ENCOUNTER — TELEPHONE (OUTPATIENT)
Dept: SURGERY | Facility: CLINIC | Age: 24
End: 2024-08-12
Payer: COMMERCIAL

## 2024-08-12 NOTE — TELEPHONE ENCOUNTER
----- Message from Abigail Johnson RN sent at 8/12/2024 12:26 PM CDT -----  2 week lap appy  ----- Message -----  From: Avelina Valenzuela MD  Sent: 8/11/2024  11:13 AM CDT  To: Abigail Johnson RN    Please have patient follow up in clinic in 2 weeks, thanks!

## 2024-08-13 LAB — PSYCHE PATHOLOGY RESULT: NORMAL

## 2024-08-16 NOTE — OP NOTE
APPENDECTOMY, LAPAROSCOPIC  Procedure Note    Antonio Daigle  8/10/2024    Pre-op Diagnosis: Acute appendicitis, unspecified acute appendicitis type [K35.80]       Post-op Diagnosis: same    Procedure(s):  APPENDECTOMY, LAPAROSCOPIC    Surgeon(s):  Lorenzo Ratliff MD    Anesthesia: General    Staff:   Circulator: Kashif Aquino, LINSEY; Joaquín Sen RN  Scrub Person: Duglas Wilson    Estimated Blood Loss: minimal               Specimens: appendix      Drains: none    Operative Technique:  Risks and benefits were discussed with patient and family at bedside, informed consent signed.  Patient was taken to the OR and placed supine, endotracheal intubation was successful.  The abdomen was then prepped and draped in sterile fashion.  A time out was completed to confirm correct patient, procedure, and all staff was in agreement.      A stab incision was made in the left upper quadrant and a Veress needle was introduced into the abdomen.  Insufflation was successful and without issue.  A otoniel-umbilical incision was then made with the scapel and a 5mm optical port and the camera was introduced into the abdomen without issue.  An additional 5mm trocar port was placed supra-pubic and a 12mm trocar port was placed in the left lower quadrant under direct visualization.  Attention was then directed to the right lower quadrant of the abdomen.  The appendix appeared to have acute inflammatory changes.  The appendix was carefully freed from surrounding adhesions using blunt dissection.  The appendix was then retracted superiorly and the appendix was dissected near its base to the cecum.  Once an adequate otoniel-appendiceal dissection was performed, the appendix was transected with an Endo-GABRIEL stapler.  The appendiceal mesentary was then transected with an Endo-GABRIEL vascular load.  The appendix was then removed when an endo-catch bag through the left lower quadrant trocar port.  Attention was re-directed to the staple lines with no  active bleeding noted.   The 12mm left lower quadrant port abdominal fascia was closed with a 0 vicryl stitch.  All ports were removed under direct visualization with no active bleeding noted. Incisions were closed with a 4-0 vicryl stitch.  The wounds were cleaned and dried and dermabond was applied.  The patient tolerated the procedure well and was transported to recovery room in good condition.       SURGEON:  Lorenzo Ratliff MD    Date: 8/10/2024  Time: 06:10 PM

## 2024-08-27 ENCOUNTER — OFFICE VISIT (OUTPATIENT)
Dept: SURGERY | Facility: CLINIC | Age: 24
End: 2024-08-27
Payer: COMMERCIAL

## 2024-08-27 VITALS
HEART RATE: 76 BPM | WEIGHT: 132.06 LBS | HEIGHT: 69 IN | SYSTOLIC BLOOD PRESSURE: 100 MMHG | DIASTOLIC BLOOD PRESSURE: 78 MMHG | BODY MASS INDEX: 19.56 KG/M2

## 2024-08-27 DIAGNOSIS — Z09 POSTOP CHECK: Primary | ICD-10-CM

## 2024-08-27 PROBLEM — K37 APPENDICITIS: Status: RESOLVED | Noted: 2024-08-10 | Resolved: 2024-08-27

## 2024-08-27 PROCEDURE — 99024 POSTOP FOLLOW-UP VISIT: CPT | Mod: ,,, | Performed by: SURGERY

## 2024-08-27 PROCEDURE — 1159F MED LIST DOCD IN RCRD: CPT | Mod: CPTII,,, | Performed by: SURGERY

## 2024-08-27 PROCEDURE — 3074F SYST BP LT 130 MM HG: CPT | Mod: CPTII,,, | Performed by: SURGERY

## 2024-08-27 PROCEDURE — 3078F DIAST BP <80 MM HG: CPT | Mod: CPTII,,, | Performed by: SURGERY

## 2024-08-27 PROCEDURE — 1160F RVW MEDS BY RX/DR IN RCRD: CPT | Mod: CPTII,,, | Performed by: SURGERY

## 2024-08-27 NOTE — PROGRESS NOTES
FINAL DIAGNOSIS      APPENDIX, APPENDECTOMY:      ACUTE APPENDICITIS WITH PERIAPPENDICITIS.      CODE 7      Electronically Signed by:   George Cat M.D. , Pathologist   (Case signed 08/13/2024 at 01:51pm)      Source   APPENDIX      Clinical Information   RIGHT LOWER QUADRANT ABDOMINAL PAIN   ACUTE APPENDICITIS, UNSPECIFIED ACUTE APPENDICITIS TYPE      Gross Description   [id number]  surjit Knutson.  Labeled on the container with the patient's name   `surjit Knutson [id number].`  Received in formalin is a vermiform appendix   received in two pieces that measure 7.4 cm in length x 1.0 cm in diameter and   5.0 cm in length x 2.0 cm in diameter.  The serosa is tan pink, glistening with   a moderate amount of tightly adherent tan white exudate.  The longer segment is   received with two undesignated staple ends.  Orientation cannot be determined.   The ends are inked blue and green, respectively.  Sectioning reveals a patent   lumen containing scant soft red brown fecal material.   The second segment is received with one stapled end.  The staples are removed.   This end is inked orange.  Sectioning reveals a patent lumen containing a   moderate amount of bloody fecal material.  A firm tan fecalith measuring 1.1 cm   in greatest dimension is noted.  The wall averages 0.2 cm in thickness.  No   masses are grossly identified.  Representative sections are submitted in   cassettes [id number] as follows   1A- Inked ends and representative cross sections   1B- Proximal 1.5 cm of second segment perpendicular to stapled end   1C- representative cross section of second segment, 1/2 of distal tip   x-[id number]      RJ/ctm 08/12/2024      Microscopic Description   1. Microscopic examination performed.  Please see diagnosis.       Jolene Dent MD  [facility], PGY-1  08/27/2024

## 2024-08-27 NOTE — PROGRESS NOTES
25 yo male over 2 weeks out from [facility]  Path confirmed appendicitis.    Sites healed.  Return to full activity over the next week.    F/u prn

## (undated) DEVICE — SOL IRRI STRL WATER 1000ML

## (undated) DEVICE — NDL HYPO 22GX1 1/2 SYR 10ML LL

## (undated) DEVICE — CANNULA ENDOPATH XCEL 5X100MM

## (undated) DEVICE — CORD LAP 10 DISP

## (undated) DEVICE — TROCAR ENDOPATH XCEL 5X100MM

## (undated) DEVICE — RELOAD ECHELON ENDOPATH 45MM

## (undated) DEVICE — SEALER LIGSR 17.8X19.5X5MM44CM

## (undated) DEVICE — IRRIGATOR SUCTION W/TIP

## (undated) DEVICE — SUT VICRYL+ 27 UR-6 VIOL

## (undated) DEVICE — ADHESIVE DERMABOND ADVANCED

## (undated) DEVICE — NDL INSUF ULTRA VERESS 120MM

## (undated) DEVICE — TRAY CATH FOL SIL URIMTR 16FR

## (undated) DEVICE — CHLORAPREP W TINT 26ML APPL

## (undated) DEVICE — GLOVE PROTEXIS BLUE LATEX 7.5

## (undated) DEVICE — GLOVE PROTEXIS LTX MICRO  7.5

## (undated) DEVICE — SCISSOR CURVED ENDOPATH 5MM

## (undated) DEVICE — TROCAR LAPSCP XCEL 12MM 10CM

## (undated) DEVICE — SUT MCRYL PLUS 4-0 PS2 27IN

## (undated) DEVICE — BAG SPEC RETRV ENDO 4X6IN DISP

## (undated) DEVICE — APPLICATOR STRL COT 2INNR 6IN

## (undated) DEVICE — WARMER DRAPE STERILE LF

## (undated) DEVICE — ELECTRODE PATIENT RETURN DISP

## (undated) DEVICE — BLADE SURG STAINLESS STEEL #11

## (undated) DEVICE — KIT SURGICAL TURNOVER

## (undated) DEVICE — KIT GEN LAPAROSCOPY LAFAYETTE

## (undated) DEVICE — STAPLER ECHELON STAND 45X340MM